# Patient Record
Sex: MALE | Race: WHITE
[De-identification: names, ages, dates, MRNs, and addresses within clinical notes are randomized per-mention and may not be internally consistent; named-entity substitution may affect disease eponyms.]

---

## 2022-10-26 ENCOUNTER — HOSPITAL ENCOUNTER (EMERGENCY)
Dept: HOSPITAL 97 - ER | Age: 62
Discharge: HOME | End: 2022-10-26
Payer: COMMERCIAL

## 2022-10-26 VITALS — OXYGEN SATURATION: 99 %

## 2022-10-26 VITALS — SYSTOLIC BLOOD PRESSURE: 109 MMHG | DIASTOLIC BLOOD PRESSURE: 73 MMHG

## 2022-10-26 VITALS — TEMPERATURE: 98.1 F

## 2022-10-26 DIAGNOSIS — S70.02XA: Primary | ICD-10-CM

## 2022-10-26 DIAGNOSIS — M87.852: ICD-10-CM

## 2022-10-26 DIAGNOSIS — F17.210: ICD-10-CM

## 2022-10-26 PROCEDURE — 72170 X-RAY EXAM OF PELVIS: CPT

## 2022-10-26 PROCEDURE — 72100 X-RAY EXAM L-S SPINE 2/3 VWS: CPT

## 2022-10-26 PROCEDURE — 99283 EMERGENCY DEPT VISIT LOW MDM: CPT

## 2022-10-26 NOTE — RAD REPORT
EXAM DESCRIPTION:  RAD - Pelvis - 10/26/2022 8:47 pm

 

CLINICAL HISTORY:  BLUNT TRAUMA

 

COMPARISON:  No comparisons

 

TECHNIQUE:  AP imaging of the pelvis was obtained.

 

FINDINGS:  No fracture of the pelvis identifiable. Lower lumbar degenerative changes are separately d
etailed. Severe bilateral hip joint degenerative changes are present. Spurring changes are seen along
 the acetabular rim. Bilateral femoral head AVN is present. Numerous subcortical degenerative cysts a
re present in each femoral head with flattened contour along each superior femoral head, right greate
r than left. No proximal femur fracture seen.

 

IMPRESSION:  Severe bilateral hip joint degenerative change as detailed.

 

No acute traumatic injury to the bony pelvis.

## 2022-10-26 NOTE — RAD REPORT
EXAM DESCRIPTION:  RAD - Lumbar Spine 3 Views - 10/26/2022 8:47 pm

 

CLINICAL HISTORY:  PAIN

 

COMPARISON:  No comparisons

 

FINDINGS:  A three-view lumbar spine examination was performed.

 

L2-L5 bodies are normal in height and alignment. Slight wedging of the L1 body is present. No lytic o
r blastic component seen. Posterior wall height of L1 is preserved. Prominent mid and lower lumbar fa
cet joint degenerative changes are present. No disc space narrowing. No pars defects identified.

 

IMPRESSION:  Slight wedging of the L1 body with posterior wall height preserved. No lytic or blastic 
component seen.

 

Age of any slight L1 compression fracture can't be determined.

 

Prominent facet joint degenerative change in the mid and lower lumbar spine.

## 2022-10-26 NOTE — EDPHYS
Physician Documentation                                                                           

 [facility]                                                                 

Name: Jeremy Onofre                                                                            

Age: 62 yrs                                                                                       

Sex: Male                                                                                         

: 1960                                                                                   

MRN: [id number]                                                                                   

Arrival Date: 10/26/2022                                                                          

Time: 20:09                                                                                       

Account#: [id number]                                                                            

Bed 23                                                                                            

Private MD:                                                                                       

ED Physician Eddie Martinez                                                                         

HPI:                                                                                              

10/26                                                                                             

21:56 This 62 yrs old Male presents to ER via Ambulatory with complaints of Hip Injury.       rn  

21:56 The patient or guardian reports an injury, pain. that occurred at home, sustained from  rn  

      a fall, There is no obvious deformity, The patient is able to self ambulate. The            

      patient is able to bear their full body weight. There is no radiation of the patient's      

      discomfort. The complaints affect the left hip. Onset: The symptoms/episode                 

      began/occurred just prior to arrival. Modifying factors: The symptoms are alleviated by     

      remaining still, the symptoms are aggravated by weight bearing. Severity of symptoms:       

      At their worst the symptoms were mild, in the emergency department the symptoms are         

      unchanged. The patient has not experienced similar symptoms in the past. The patient        

      has not recently seen a physician. Pt reports fall from standing, hit left hip, reports     

      pain to left hip. Is ambulatory and pain not improving. NO weakness. + chronic pain to      

      both hips and previous lumbar fracture. .                                                   

                                                                                                  

Historical:                                                                                       

- Allergies:                                                                                      

20:19 No Known Allergies;                                                                     ld1 

- PMHx:                                                                                           

20:19 Hypertensive disorder;                                                                  ld1 

- PSHx:                                                                                           

20:19 None;                                                                                   ld1 

                                                                                                  

- Immunization history:: Adult Immunizations up to date, Client reports having NOT                

  received the Covid vaccine.                                                                     

- Social history:: Smoking status: Patient reports the use of cigarette tobacco                   

  products, smokes two packs cigarettes per day. Patient uses alcohol, occasionally.              

- Family history:: not pertinent.                                                                 

- Hospitalizations: : No recent hospitalization is reported.                                      

                                                                                                  

                                                                                                  

ROS:                                                                                              

21:56 Constitutional: Negative for fever, chills, and weight loss, Eyes: Negative for injury, rn  

      pain, redness, and discharge, Neck: Negative for injury, pain, and swelling,                

      Cardiovascular: Negative for chest pain, palpitations, and edema, Respiratory: Negative     

      for shortness of breath, cough, wheezing, and pleuritic chest pain, Abdomen/GI:             

      Negative for abdominal pain, nausea, vomiting, diarrhea, and constipation, Back: + low      

      back pain : Negative for injury, bleeding, discharge, and swelling, MS/Extremity: +       

      left hip pain Skin: Negative for injury, rash, and discoloration, Neuro: Negative for       

      headache, weakness, numbness, tingling, and seizure.                                        

                                                                                                  

Exam:                                                                                             

21:56 Constitutional:  This is a well developed, well nourished patient who is awake, alert,  rn  

      limping, but walking with his walker to triage Head/Face:  Normocephalic, atraumatic.       

      Neck:  Trachea midline, no thyromegaly or masses palpated, and no cervical                  

      lymphadenopathy.  Supple, full range of motion without nuchal rigidity, or vertebral        

      point tenderness.  No Meningismus. Cardiovascular:  Regular rate and rhythm.  No pulse      

      deficits. Respiratory:  No increased work of breathing, no retractions or nasal             

      flaring. Abdomen/GI:  Soft, non-tender Back:  No spinal tenderness.  No costovertebral      

      tenderness.  Full range of motion. Skin:  Warm, dry with normal turgor.  Normal color       

      with no rashes, no lesions, and no evidence of cellulitis. MS/ Extremity:  Pulses           

      equal, no cyanosis.  Neurovascular intact.  + tenderness left lateral hip, no anterior      

      hip tenderness.  Neuro:  Awake and alert, GCS 15                                            

                                                                                                  

Vital Signs:                                                                                      

20:22  / 88; Pulse 78; Resp 18; Temp 98.1(TE); Pulse Ox 98% on R/A; Weight 90.72 kg;    ld1 

      Height 5 ft. 10 in. (177.80 cm); Pain 9/10;                                                 

20:56  / 87; Pulse 76; Resp 16; Pulse Ox 99% on R/A; Pain 10/10;                        eh3 

21:39  / 73; Pulse 93; Resp 16; Pulse Ox 99% on R/A;                                    eh3 

20:22 Body Mass Index 28.70 (90.72 kg, 177.80 cm)                                             ld1 

                                                                                                  

MDM:                                                                                              

20:09 Patient medically screened.                                                             rn  

21:56 Differential diagnosis: hip fracture, intertrochanteric fracture, femoral neck          rn  

      fracture, bursitis, strain, contusion. Data reviewed: vital signs, nurses notes,            

      radiologic studies, plain films, and as a result, I will discharge patient. Counseling:     

      I had a detailed discussion with the patient and/or guardian regarding: the historical      

      points, exam findings, and any diagnostic results supporting the discharge/admit            

      diagnosis, radiology results, the need for outpatient follow up, to return to the           

      emergency department if symptoms worsen or persist or if there are any questions or         

      concerns that arise at home. Response to treatment: the patient's symptoms have mildly      

      improved after treatment, and as a result, I will discharge patient. Special                

      discussion: I discussed with the patient/guardian in detail that at this point there is     

      no indication for admission to the hospital. It is understood, however, that if the         

      symptoms persist or worsen the patient needs to return immediately for re-evaluation.       

      Based on the history and exam findings, there is no indication for further emergent         

      testing or inpatient evaluation. I discussed with the patient/guardian the need to see      

      the orthopedic surgeon for further evaluation of the symptoms. ED course: Pt notified       

      and printed results of AVN, likely explaining his chronic left hip pain. Will f/u with      

      ortho. .                                                                                    

                                                                                                  

10/26                                                                                             

20:21 Order name: XRAY Hip LEFT 2 view; Complete Time: 21:16                                  rn  

10/26                                                                                             

20:21 Order name: XRAY Pelvis; Complete Time: 21:16                                           rn  

10/26                                                                                             

20:21 Order name: XRAY Lumbar Spine (3 Views); Complete Time: 21:16                           rn  

                                                                                                  

Administered Medications:                                                                         

21:39 Drug: Hydrocodone-Acetaminophen (10 mg-500 mg) 1 tabs Route: PO;                        eh3 

22:19 Follow up: Response: No adverse reaction                                                eh3 

                                                                                                  

                                                                                                  

Disposition Summary:                                                                              

10/26/22 22:01                                                                                    

Discharge Ordered                                                                                 

      Location: Home                                                                          rn  

      Problem: new                                                                            rn  

      Symptoms: have improved                                                                 rn  

      Condition: Stable                                                                       rn  

      Diagnosis                                                                                   

        - Contusion of left hip                                                               rn  

        - Avascular necrosis left femoral head                                                rn  

      Followup:                                                                               rn  

        - With: Camacho Ferguson MD                                                                

        - When: As needed                                                                          

        - Reason: Recheck today's complaints, Re-evaluation by your physician                      

      Discharge Instructions:                                                                     

        - Contusion                                                                           rn  

        - Avascular Necrosis                                                                  rn  

        - Discharge Summary Sheet                                                             pm1 

        - Form - Return To Work                                                               eh3 

        - Form - Excuse from Work, School, or Physical Activity                               eh3 

      Forms:                                                                                      

        - Medication Reconciliation Form                                                      rn  

        - Thank You Letter                                                                    rn  

        - Antibiotic Education                                                                rn  

        - Prescription Opioid Use                                                             rn  

        - Family Work Release                                                                 eh3 

        - School release form                                                                 kl  

      Prescriptions:                                                                              

        - Tramadol 50 mg Oral Tablet                                                               

            - take 1 tablet by ORAL route every 8 hours as needed; 12 tablet; Refills: 0,     pm1 

      Product Selection Permitted                                                                 

Signatures:                                                                                       

Dispatcher [employer]                           EDEddie Barrientos MD MD rn Dibbern, Lauren, RN                     RN   ld1                                                  

Guillermo, Zoila, RN                          RN   eh3                                                  

                                                                                                  

**************************************************************************************************

## 2022-10-26 NOTE — RAD REPORT
EXAM DESCRIPTION:  RAD - Hip Left 2 View - 10/26/2022 8:47 pm

 

CLINICAL HISTORY:  PAIN

 

COMPARISON:  No comparisons

 

FINDINGS:  AP and frogleg views of the left hip were obtained.

 

No acute fracture. No dislocation femoral head. Severe joint degenerative changes are present. Spurri
ng is seen along the acetabular rim. Femoral head is grossly abnormal with numerous subcortical degen
erative cystic changes seen. There is partial collapse of the superior margin femoral head. No fractu
re of the proximal femur seen. No periarticular abnormality. Partially imaged left hemipelvis shows n
o acute finding.

 

No soft tissue abnormality.

 

 

IMPRESSION:  Advanced degenerative change with left femoral head AVN with partial collapse along the 
superior articular margin.